# Patient Record
Sex: MALE | Race: ASIAN | NOT HISPANIC OR LATINO
[De-identification: names, ages, dates, MRNs, and addresses within clinical notes are randomized per-mention and may not be internally consistent; named-entity substitution may affect disease eponyms.]

---

## 2022-06-06 ENCOUNTER — APPOINTMENT (OUTPATIENT)
Dept: PULMONOLOGY | Facility: CLINIC | Age: 27
End: 2022-06-06
Payer: COMMERCIAL

## 2022-06-06 ENCOUNTER — LABORATORY RESULT (OUTPATIENT)
Age: 27
End: 2022-06-06

## 2022-06-06 VITALS
HEART RATE: 85 BPM | WEIGHT: 135 LBS | BODY MASS INDEX: 18.28 KG/M2 | SYSTOLIC BLOOD PRESSURE: 114 MMHG | DIASTOLIC BLOOD PRESSURE: 72 MMHG | TEMPERATURE: 97.2 F | HEIGHT: 72.05 IN | OXYGEN SATURATION: 99 %

## 2022-06-06 VITALS — RESPIRATION RATE: 16 BRPM

## 2022-06-06 DIAGNOSIS — R05.9 COUGH, UNSPECIFIED: ICD-10-CM

## 2022-06-06 PROCEDURE — 99204 OFFICE O/P NEW MOD 45 MIN: CPT | Mod: 25

## 2022-06-06 PROCEDURE — ZZZZZ: CPT

## 2022-06-06 PROCEDURE — 95012 NITRIC OXIDE EXP GAS DETER: CPT

## 2022-06-06 PROCEDURE — 94729 DIFFUSING CAPACITY: CPT

## 2022-06-06 PROCEDURE — 94010 BREATHING CAPACITY TEST: CPT

## 2022-06-06 PROCEDURE — 94726 PLETHYSMOGRAPHY LUNG VOLUMES: CPT

## 2022-06-06 NOTE — HISTORY OF PRESENT ILLNESS
[Never] : never [Current] : current [TextBox_4] : PMH chronic sinusitis, seasonal allergies. Recently worsening - PCP prescribed Augmentin, Breo, Flonase. Severe wheeze and cough, worse at night.  Symptoms improved with abx and Breo. \par \par Diagnosed with childhood asthma\par Physically active - swim, track\par \par Social: \par Former smoker\par Vape nicotine\par Marijuana (since age 18) \par User interfaces \par Pet dog - Shiba Inu\par \par

## 2022-06-06 NOTE — ASSESSMENT
[FreeTextEntry1] : Asthma, mild :  FENO 91 today. d/c Breo. Start Symbicort prn. Check asthma labs. Smoking cessation discussed. Monitor worsening congestion/ sputum changes- f/u ENT prn.\par \par RTC in one month\par \par IAnge NP, am scribing for and in the presence of Dr. Jericho Dooley, the following sections HISTORY OF PRESENT ILLNESS, PAST MEDICAL/FAMILY/SOCIAL HISTORY; REVIEW OF SYSTEMS; VITAL SIGNS; PHYSICAL EXAM; DISPOSITION.\par

## 2022-06-07 LAB
BASOPHILS # BLD AUTO: 0.1 K/UL
BASOPHILS NFR BLD AUTO: 1.9 %
EOSINOPHIL # BLD AUTO: 0.52 K/UL
EOSINOPHIL NFR BLD AUTO: 9.9 %
HCT VFR BLD CALC: 48 %
HGB BLD-MCNC: 16.6 G/DL
IMM GRANULOCYTES NFR BLD AUTO: 0.2 %
LYMPHOCYTES # BLD AUTO: 1.18 K/UL
LYMPHOCYTES NFR BLD AUTO: 22.5 %
MAN DIFF?: NORMAL
MCHC RBC-ENTMCNC: 31.1 PG
MCHC RBC-ENTMCNC: 34.6 GM/DL
MCV RBC AUTO: 90.1 FL
MONOCYTES # BLD AUTO: 0.43 K/UL
MONOCYTES NFR BLD AUTO: 8.2 %
NEUTROPHILS # BLD AUTO: 3.01 K/UL
NEUTROPHILS NFR BLD AUTO: 57.3 %
PLATELET # BLD AUTO: 162 K/UL
RBC # BLD: 5.33 M/UL
RBC # FLD: 12.3 %
WBC # FLD AUTO: 5.25 K/UL

## 2022-06-08 LAB — TOTAL IGE SMQN RAST: 289 KU/L

## 2022-06-13 LAB
A ALTERNATA IGE QN: <0.1 KUA/L
A FUMIGATUS IGE QN: <0.1 KUA/L
BERMUDA GRASS IGE QN: 0.15 KUA/L
BOXELDER IGE QN: 0.25 KUA/L
C HERBARUM IGE QN: <0.1 KUA/L
CALIF WALNUT IGE QN: NORMAL
CAT DANDER IGE QN: <0.1 KUA/L
CMN PIGWEED IGE QN: <0.1 KUA/L
COMMON RAGWEED IGE QN: 0.14 KUA/L
COTTONWOOD IGE QN: 0.3 KUA/L
D FARINAE IGE QN: 9.88 KUA/L
D PTERONYSS IGE QN: 6.04 KUA/L
DEPRECATED A ALTERNATA IGE RAST QL: 0
DEPRECATED A FUMIGATUS IGE RAST QL: 0
DEPRECATED BERMUDA GRASS IGE RAST QL: NORMAL
DEPRECATED BOXELDER IGE RAST QL: NORMAL
DEPRECATED C HERBARUM IGE RAST QL: 0
DEPRECATED CAT DANDER IGE RAST QL: 0
DEPRECATED COMMON PIGWEED IGE RAST QL: 0
DEPRECATED COMMON RAGWEED IGE RAST QL: NORMAL
DEPRECATED COTTONWOOD IGE RAST QL: NORMAL
DEPRECATED D FARINAE IGE RAST QL: 3
DEPRECATED D PTERONYSS IGE RAST QL: 3
DEPRECATED DOG DANDER IGE RAST QL: 0
DEPRECATED GOOSEFOOT IGE RAST QL: NORMAL
DEPRECATED LONDON PLANE IGE RAST QL: NORMAL
DEPRECATED MOUSE URINE PROT IGE RAST QL: NORMAL
DEPRECATED MUGWORT IGE RAST QL: 0
DEPRECATED P NOTATUM IGE RAST QL: 0
DEPRECATED RED CEDAR IGE RAST QL: NORMAL
DEPRECATED ROACH IGE RAST QL: 0
DEPRECATED SHEEP SORREL IGE RAST QL: NORMAL
DEPRECATED SILVER BIRCH IGE RAST QL: 2
DEPRECATED TIMOTHY IGE RAST QL: NORMAL
DEPRECATED WHITE ASH IGE RAST QL: NORMAL
DEPRECATED WHITE OAK IGE RAST QL: 2
DOG DANDER IGE QN: <0.1 KUA/L
GOOSEFOOT IGE QN: 0.2 KUA/L
LONDON PLANE IGE QN: NORMAL
MOUSE URINE PROT IGE QN: NORMAL
MUGWORT IGE QN: <0.1 KUA/L
MULBERRY (T70) CLASS: NORMAL
MULBERRY (T70) CONC: NORMAL
P NOTATUM IGE QN: <0.1 KUA/L
RED CEDAR IGE QN: NORMAL
ROACH IGE QN: <0.1 KUA/L
SHEEP SORREL IGE QN: NORMAL
SILVER BIRCH IGE QN: 2.51 KUA/L
TIMOTHY IGE QN: NORMAL
TREE ALLERG MIX1 IGE QL: NORMAL
WHITE ASH IGE QN: NORMAL
WHITE ELM IGE QN: 0.28 KUA/L
WHITE ELM IGE QN: NORMAL
WHITE OAK IGE QN: 2.21 KUA/L

## 2022-10-17 ENCOUNTER — NON-APPOINTMENT (OUTPATIENT)
Age: 27
End: 2022-10-17

## 2022-10-18 ENCOUNTER — APPOINTMENT (OUTPATIENT)
Dept: PULMONOLOGY | Facility: CLINIC | Age: 27
End: 2022-10-18

## 2022-10-18 VITALS
BODY MASS INDEX: 19.56 KG/M2 | WEIGHT: 146 LBS | HEIGHT: 72.25 IN | RESPIRATION RATE: 15 BRPM | TEMPERATURE: 97.3 F | HEART RATE: 71 BPM | DIASTOLIC BLOOD PRESSURE: 83 MMHG | OXYGEN SATURATION: 97 % | SYSTOLIC BLOOD PRESSURE: 125 MMHG

## 2022-10-18 PROCEDURE — 99214 OFFICE O/P EST MOD 30 MIN: CPT

## 2022-10-18 NOTE — HISTORY OF PRESENT ILLNESS
[TextBox_4] : 27 y.o male PMH chronic sinusitis, seasonal allergies, deviated septum, nasal polyps,  Recently worsening - PCP prescribed Augmentin, Breo, Flonase. 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 d/c Breo. Start Symbicort prn. Check asthma labs. Smoking cessation discussed. Monitor worsening congestion/ sputum changes- f/u ENT prn.\par \par RTC in one month\par \par

## 2022-10-18 NOTE — ASSESSMENT
[FreeTextEntry1] : Asthma, mild :symptoms controlled with Symbicort prn. Ongoing smoking cessation reinforced. Referred to ENT for eval nasal polyps\par \par RTC in 6 months or earlier prn\par \par Ange FRANKLIN NP, am scribing for and in the presence of Dr. Jericho Dooley, the following sections HISTORY OF PRESENT ILLNESS, PAST MEDICAL/FAMILY/SOCIAL HISTORY; REVIEW OF SYSTEMS; VITAL SIGNS; PHYSICAL EXAM; DISPOSITION.\par

## 2022-10-26 ENCOUNTER — NON-APPOINTMENT (OUTPATIENT)
Age: 27
End: 2022-10-26

## 2022-10-26 RX ORDER — ALBUTEROL SULFATE 90 UG/1
108 (90 BASE) INHALANT RESPIRATORY (INHALATION)
Qty: 1 | Refills: 3 | Status: ACTIVE | COMMUNITY
Start: 2022-10-26 | End: 1900-01-01

## 2022-10-27 ENCOUNTER — NON-APPOINTMENT (OUTPATIENT)
Age: 27
End: 2022-10-27

## 2022-10-28 ENCOUNTER — NON-APPOINTMENT (OUTPATIENT)
Age: 27
End: 2022-10-28

## 2023-06-15 ENCOUNTER — NON-APPOINTMENT (OUTPATIENT)
Age: 28
End: 2023-06-15

## 2023-08-09 ENCOUNTER — APPOINTMENT (OUTPATIENT)
Dept: PULMONOLOGY | Facility: CLINIC | Age: 28
End: 2023-08-09
Payer: COMMERCIAL

## 2023-08-09 VITALS
RESPIRATION RATE: 15 BRPM | HEIGHT: 72.25 IN | OXYGEN SATURATION: 94 % | TEMPERATURE: 99.4 F | DIASTOLIC BLOOD PRESSURE: 80 MMHG | BODY MASS INDEX: 19.16 KG/M2 | WEIGHT: 143 LBS | HEART RATE: 68 BPM | SYSTOLIC BLOOD PRESSURE: 126 MMHG

## 2023-08-09 PROCEDURE — 99214 OFFICE O/P EST MOD 30 MIN: CPT

## 2023-08-09 NOTE — HISTORY OF PRESENT ILLNESS
[Never] : never [Current] : current [TextBox_4] : 27 y.o male PMH chronic sinusitis, seasonal allergies, deviated septum, nasal polyps, Using Symbicort 1 puff bid consistently. No exacerbations, hospitalizations, ED visits.   PFT June 2022: FENO 91, FEV1/FVC 80, FEV1: 4.08 (100)  Social: Marijuana smoker

## 2023-08-09 NOTE — ASSESSMENT
[FreeTextEntry1] : Asthma: mild: stable on Symbicort prn.Con current therapy.   Annual follow up or earlier Ange Barrientos NP, am scribing for and in the presence of Dr. Jericho Dooley, the following sections HISTORY OF PRESENT ILLNESS, PAST MEDICAL/FAMILY/SOCIAL HISTORY; REVIEW OF SYSTEMS; VITAL SIGNS; PHYSICAL EXAM; DISPOSITION.

## 2024-01-24 ENCOUNTER — APPOINTMENT (OUTPATIENT)
Dept: INTERNAL MEDICINE | Facility: CLINIC | Age: 29
End: 2024-01-24
Payer: COMMERCIAL

## 2024-01-24 ENCOUNTER — NON-APPOINTMENT (OUTPATIENT)
Age: 29
End: 2024-01-24

## 2024-01-24 VITALS
WEIGHT: 140 LBS | BODY MASS INDEX: 18.55 KG/M2 | HEART RATE: 80 BPM | DIASTOLIC BLOOD PRESSURE: 81 MMHG | HEIGHT: 73 IN | TEMPERATURE: 97.8 F | RESPIRATION RATE: 15 BRPM | SYSTOLIC BLOOD PRESSURE: 117 MMHG | OXYGEN SATURATION: 97 %

## 2024-01-24 DIAGNOSIS — Z83.3 FAMILY HISTORY OF DIABETES MELLITUS: ICD-10-CM

## 2024-01-24 DIAGNOSIS — Z82.49 FAMILY HISTORY OF ISCHEMIC HEART DISEASE AND OTHER DISEASES OF THE CIRCULATORY SYSTEM: ICD-10-CM

## 2024-01-24 DIAGNOSIS — Z00.00 ENCOUNTER FOR GENERAL ADULT MEDICAL EXAMINATION W/OUT ABNORMAL FINDINGS: ICD-10-CM

## 2024-01-24 DIAGNOSIS — Z23 ENCOUNTER FOR IMMUNIZATION: ICD-10-CM

## 2024-01-24 DIAGNOSIS — K21.9 GASTRO-ESOPHAGEAL REFLUX DISEASE W/OUT ESOPHAGITIS: ICD-10-CM

## 2024-01-24 PROCEDURE — 36415 COLL VENOUS BLD VENIPUNCTURE: CPT

## 2024-01-24 PROCEDURE — G0008: CPT

## 2024-01-24 PROCEDURE — 99385 PREV VISIT NEW AGE 18-39: CPT | Mod: 25

## 2024-01-24 PROCEDURE — 90686 IIV4 VACC NO PRSV 0.5 ML IM: CPT

## 2024-01-24 RX ORDER — BUDESONIDE AND FORMOTEROL FUMARATE DIHYDRATE 80; 4.5 UG/1; UG/1
80-4.5 AEROSOL RESPIRATORY (INHALATION) TWICE DAILY
Qty: 1 | Refills: 11 | Status: COMPLETED | COMMUNITY
Start: 2022-06-06 | End: 2024-01-24

## 2024-01-24 RX ORDER — FLUTICASONE FUROATE AND VILANTEROL TRIFENATATE 200; 25 UG/1; UG/1
200-25 POWDER RESPIRATORY (INHALATION)
Refills: 0 | Status: COMPLETED | COMMUNITY
End: 2024-01-24

## 2024-01-24 NOTE — PHYSICAL EXAM
[No Acute Distress] : no acute distress [Well-Appearing] : well-appearing [Normal Sclera/Conjunctiva] : normal sclera/conjunctiva [EOMI] : extraocular movements intact [No Respiratory Distress] : no respiratory distress  [No Accessory Muscle Use] : no accessory muscle use [Clear to Auscultation] : lungs were clear to auscultation bilaterally [Normal Rate] : normal rate  [Regular Rhythm] : with a regular rhythm [Normal S1, S2] : normal S1 and S2 [No Edema] : there was no peripheral edema [No Extremity Clubbing/Cyanosis] : no extremity clubbing/cyanosis [Soft] : abdomen soft [Non Tender] : non-tender [Non-distended] : non-distended [Normal Bowel Sounds] : normal bowel sounds [Normal Insight/Judgement] : insight and judgment were intact [Normal Affect] : the affect was normal

## 2024-01-24 NOTE — ASSESSMENT
[FreeTextEntry1] : HCM -well visit -routine labs ordered -depression screen negative -flu vaccine- given and tolerated well  -covid vaccines- s/p 3 doses  -tdap -unsure when last received, advised to check records and obtain if 10+ years since last dose -advised to get annual eye, dental, and skin exams -RTC for CPE in 1 year or sooner prn  Reflux -trial pepcid -lifestyle modifications discussed including head of bed elevation at night, avoiding lying down 2-3 hours after eating, eating small, frequent meals, and minimizing potential food triggers (tomatoes/sauce, caffeine, alcohol, spicy foods, citrus foods, red meat, chocolate, mint, carbonated beverages -RTC if refractory  Asthma -c/w symbicort bid and albuterol prn -follows [facility] pulm Dr. Dooley   Chronic sinusitis Nasal polyps -ENT referral

## 2024-01-24 NOTE — HISTORY OF PRESENT ILLNESS
[FreeTextEntry1] : cpe/est care [de-identified] : POLINA FERNANDEZ is a 28 year M who presents for CPE/est care PMHx chronic sinusitis, nasal polyps, asthma  Previous PCP Dr. Jay Coello- retired  Feels well overall  Reports reflux early in the morning which self resolves within an hour of waking. Eats late at night, sleeps shortly after, skips breakfast but drinks coffee.   Pulm Dr. Ilowite

## 2024-01-24 NOTE — HEALTH RISK ASSESSMENT
[No] : No [Yes] : In the past 12 months have you used drugs other than those required for medical reasons? Yes [0] : 2) Feeling down, depressed, or hopeless: Not at all (0) [PHQ-2 Negative - No further assessment needed] : PHQ-2 Negative - No further assessment needed [Employed] : employed [] :  [# Of Children ___] : has [unfilled] children [Feels Safe at Home] : Feels safe at home [Fully functional (bathing, dressing, toileting, transferring, walking, feeding)] : Fully functional (bathing, dressing, toileting, transferring, walking, feeding) [Fully functional (using the telephone, shopping, preparing meals, housekeeping, doing laundry, using] : Fully functional and needs no help or supervision to perform IADLs (using the telephone, shopping, preparing meals, housekeeping, doing laundry, using transportation, managing medications and managing finances) [Former] : Former [0-4] : 0-4 [< 15 Years] : < 15 Years [de-identified] : vapes marijuana [[id number]] : 0 [de-identified] : wife [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

## 2024-01-25 LAB
25(OH)D3 SERPL-MCNC: 19.1 NG/ML
ALBUMIN SERPL ELPH-MCNC: 5 G/DL
ALP BLD-CCNC: 43 U/L
ALT SERPL-CCNC: 24 U/L
ANION GAP SERPL CALC-SCNC: 13 MMOL/L
AST SERPL-CCNC: 23 U/L
BILIRUB SERPL-MCNC: 0.5 MG/DL
BUN SERPL-MCNC: 13 MG/DL
CALCIUM SERPL-MCNC: 9.8 MG/DL
CHLORIDE SERPL-SCNC: 104 MMOL/L
CHOLEST SERPL-MCNC: 233 MG/DL
CO2 SERPL-SCNC: 22 MMOL/L
CREAT SERPL-MCNC: 0.91 MG/DL
EGFR: 118 ML/MIN/1.73M2
ESTIMATED AVERAGE GLUCOSE: 108 MG/DL
GLUCOSE SERPL-MCNC: 98 MG/DL
HBA1C MFR BLD HPLC: 5.4 %
HCT VFR BLD CALC: 48.5 %
HDLC SERPL-MCNC: 63 MG/DL
HGB BLD-MCNC: 16.8 G/DL
LDLC SERPL CALC-MCNC: 156 MG/DL
MCHC RBC-ENTMCNC: 31.5 PG
MCHC RBC-ENTMCNC: 34.6 GM/DL
MCV RBC AUTO: 91 FL
NONHDLC SERPL-MCNC: 170 MG/DL
PLATELET # BLD AUTO: 161 K/UL
POTASSIUM SERPL-SCNC: 4.1 MMOL/L
PROT SERPL-MCNC: 6.9 G/DL
RBC # BLD: 5.33 M/UL
RBC # FLD: 12.6 %
SODIUM SERPL-SCNC: 139 MMOL/L
TRIGL SERPL-MCNC: 81 MG/DL
TSH SERPL-ACNC: 2.51 UIU/ML
WBC # FLD AUTO: 5.07 K/UL

## 2024-03-19 ENCOUNTER — APPOINTMENT (OUTPATIENT)
Dept: PULMONOLOGY | Facility: CLINIC | Age: 29
End: 2024-03-19
Payer: COMMERCIAL

## 2024-03-19 VITALS
BODY MASS INDEX: 18.16 KG/M2 | RESPIRATION RATE: 16 BRPM | HEIGHT: 73 IN | WEIGHT: 137 LBS | HEART RATE: 71 BPM | TEMPERATURE: 97.8 F | OXYGEN SATURATION: 97 % | DIASTOLIC BLOOD PRESSURE: 61 MMHG | SYSTOLIC BLOOD PRESSURE: 123 MMHG

## 2024-03-19 PROCEDURE — 99214 OFFICE O/P EST MOD 30 MIN: CPT

## 2024-03-19 NOTE — HISTORY OF PRESENT ILLNESS
[Current] : current [Never] : never [TextBox_4] : 27 y.o male PMH chronic sinusitis, seasonal allergies, deviated septum, nasal polyps, Doing well on symbicort, had to change to Breyna for insurance reasons. No exacerbations.  PFT June 2022: FENO 91, FEV1/FVC 80, FEV1: 4.08 (100)  Social: Marijuana smoker and vaping.  Using Breyna about once or twice a day.

## 2024-03-19 NOTE — PHYSICAL EXAM
[No Acute Distress] : no acute distress [Normal Rate/Rhythm] : normal rate/rhythm [No Resp Distress] : no resp distress [Normal S1, S2] : normal s1, s2 [Clear to Auscultation Bilaterally] : clear to auscultation bilaterally [Normal Gait] : normal gait [No Edema] : no edema [Oriented x3] : oriented x3 [No Focal Deficits] : no focal deficits

## 2024-03-19 NOTE — ASSESSMENT
[FreeTextEntry1] : Asthma: mild: stable on Breyna prn.Con current therapy.   Annual follow up or earlier orn

## 2024-04-05 ENCOUNTER — NON-APPOINTMENT (OUTPATIENT)
Age: 29
End: 2024-04-05

## 2024-04-05 ENCOUNTER — APPOINTMENT (OUTPATIENT)
Dept: OTOLARYNGOLOGY | Facility: CLINIC | Age: 29
End: 2024-04-05
Payer: COMMERCIAL

## 2024-04-05 VITALS — TEMPERATURE: 98.2 F | WEIGHT: 137 LBS | BODY MASS INDEX: 18.16 KG/M2 | HEIGHT: 73 IN

## 2024-04-05 DIAGNOSIS — J33.9 NASAL POLYP, UNSPECIFIED: ICD-10-CM

## 2024-04-05 DIAGNOSIS — J45.909 UNSPECIFIED ASTHMA, UNCOMPLICATED: ICD-10-CM

## 2024-04-05 DIAGNOSIS — H93.293 OTHER ABNORMAL AUDITORY PERCEPTIONS, BILATERAL: ICD-10-CM

## 2024-04-05 DIAGNOSIS — H69.93 UNSPECIFIED EUSTACHIAN TUBE DISORDER, BILATERAL: ICD-10-CM

## 2024-04-05 DIAGNOSIS — H61.23 IMPACTED CERUMEN, BILATERAL: ICD-10-CM

## 2024-04-05 DIAGNOSIS — J32.9 CHRONIC SINUSITIS, UNSPECIFIED: ICD-10-CM

## 2024-04-05 PROCEDURE — 92557 COMPREHENSIVE HEARING TEST: CPT

## 2024-04-05 PROCEDURE — 31231 NASAL ENDOSCOPY DX: CPT

## 2024-04-05 PROCEDURE — 92567 TYMPANOMETRY: CPT

## 2024-04-05 PROCEDURE — 99204 OFFICE O/P NEW MOD 45 MIN: CPT | Mod: 25

## 2024-04-05 NOTE — PHYSICAL EXAM
[Midline] : trachea located in midline position [Normal] : no rashes [de-identified] : Cerumen obstructin view of TM AU [de-identified] : b/l retraction [Nasal Endoscopy Performed] : nasal endoscopy was performed, see procedure section for findings [] : septum deviated to the right

## 2024-04-05 NOTE — HISTORY OF PRESENT ILLNESS
[de-identified] : 29 y/o M with ear fullness and muffled hearing.  Worse after a shower.  Gets mild tinnitus when ear feels fullest.  No vertigo. Has seasonal allergies and Asthma, and hx of nasal polyps.  Has never taken ASA to know if has an allergy.  following with Pulm, Dr. Dooley.  Uses an MDI and when allergies flair up takes Zyrtec.

## 2024-04-05 NOTE — END OF VISIT
[FreeTextEntry3] : I personally saw and examined POLINA FERNANDEZ in detail. I spoke to LIN Gray regarding the assessment and plan of care. I reviewed the above assessment and plan of care and agree. I have made changes in changes in the body of the note where appropriate. I personally reviewed the HPI, PMH, FH, SH, ROS and medications/allergies. I have spoken to LIN Gray regarding the history and have personally determined the assessment and plan of care and documented this myself. I was present and participated in all key portions of the encounter and all procedures noted above. I performed all procedures and performed the physical exam. I have made changes in the body of the note where appropriate.   Attesting Faculty: See Attending Signature Below

## 2024-04-05 NOTE — PROCEDURE
[FreeTextEntry3] : Procedure - Cerumen Removal.  After informed verbal consent is obtained, cerumen is removed from the b/l ear canal with a alligator forceps and suction.  Normal appearing canal following removal. [FreeTextEntry6] : Procedure performed: Nasal Endoscopy- Diagnostic indication(s): nasal congestion, Anterior rhinoscopy insufficient to account for symptoms Verbal and/or written consent obtained from patient, parent, and/or guardian Scope #: 23,  flexible fiber optic telescope used The scope was introduced in the nasal passage between the middle and inferior turbinates to exam the inferior portion of the middle meatus and the fontanelle, as well as the maxillary ostia.  Next, the scope was passed medically and posteriorly to the middle turbinates to examine the sphenoethmoid recess and the superior turbinate region. Upon visualization the finders are as follows: Nasal Septum: with Large right Deviation Right Side: Mucosa: boggy turbinates, Mucous: scant, Polyp: not seen, Inferior Turbinate: boggy, Middle Turbinate: normal, Superior Turbinate: normal, Inferior Meatus: narrow, Middle Meatus: narrow, Super Meatus:normal, Sphenoethmoidal Recess: clear Left Side: Mucosa: boggy turbinates, Mucous: scant, Polyp: not seen, Inferior Turbinate: boggy, Middle Turbinate: normal, Superior Turbinate: normal, Inferior Meatus: narrow, Middle Meatus: narrow, Super Meatus:normal, Sphenoethmoidal Recess: clear The patient tolerated the procedure well

## 2024-04-05 NOTE — ASSESSMENT
[FreeTextEntry1] : B/L ETD, likely due to allergic rhinitis complicated by large right DNS: - Audiogram today - - Rx - Flonase - Rx - Azelastine - F/U with allergist, Dr. Colon, information given.  Also advised to d/c all antihistamines 10 days before the appt.  - F/U with Dr. Barry to discuss septoplasty   B/L cerumen: - removed in office today

## 2024-04-26 ENCOUNTER — APPOINTMENT (OUTPATIENT)
Dept: OTOLARYNGOLOGY | Facility: CLINIC | Age: 29
End: 2024-04-26
Payer: COMMERCIAL

## 2024-04-26 VITALS — HEIGHT: 73 IN | WEIGHT: 140 LBS | HEART RATE: 84 BPM | BODY MASS INDEX: 18.55 KG/M2

## 2024-04-26 DIAGNOSIS — J30.89 OTHER ALLERGIC RHINITIS: ICD-10-CM

## 2024-04-26 DIAGNOSIS — M95.0 ACQUIRED DEFORMITY OF NOSE: ICD-10-CM

## 2024-04-26 DIAGNOSIS — J34.3 HYPERTROPHY OF NASAL TURBINATES: ICD-10-CM

## 2024-04-26 DIAGNOSIS — J34.2 DEVIATED NASAL SEPTUM: ICD-10-CM

## 2024-04-26 PROCEDURE — 99214 OFFICE O/P EST MOD 30 MIN: CPT | Mod: 25

## 2024-04-26 PROCEDURE — 31231 NASAL ENDOSCOPY DX: CPT

## 2024-04-26 RX ORDER — CETIRIZINE HCL 10 MG
TABLET ORAL
Refills: 0 | Status: ACTIVE | COMMUNITY

## 2024-04-26 RX ORDER — FAMOTIDINE 20 MG/1
20 TABLET, FILM COATED ORAL
Qty: 30 | Refills: 1 | Status: COMPLETED | COMMUNITY
Start: 2024-01-24 | End: 2024-04-26

## 2024-04-26 RX ORDER — FLUTICASONE PROPIONATE 50 UG/1
50 SPRAY, METERED NASAL
Qty: 3 | Refills: 0 | Status: COMPLETED | COMMUNITY
Start: 2024-04-05 | End: 2024-04-26

## 2024-04-26 RX ORDER — AZELASTINE HYDROCHLORIDE 137 UG/1
0.1 SPRAY, METERED NASAL TWICE DAILY
Qty: 3 | Refills: 3 | Status: COMPLETED | COMMUNITY
Start: 2024-04-05 | End: 2024-04-26

## 2024-04-26 RX ORDER — BUDESONIDE AND FORMOTEROL FUMARATE 160; 4.5 UG/1; UG/1
160-4.5 AEROSOL, METERED RESPIRATORY (INHALATION)
Refills: 0 | Status: ACTIVE | COMMUNITY

## 2024-04-26 RX ORDER — BUDESONIDE AND FORMOTEROL FUMARATE 80; 4.5 UG/1; UG/1
80-4.5 AEROSOL, METERED RESPIRATORY (INHALATION)
Qty: 1 | Refills: 11 | Status: COMPLETED | COMMUNITY
Start: 2024-01-08 | End: 2024-04-26

## 2024-04-26 NOTE — END OF VISIT
[FreeTextEntry3] :  I personally saw and examined POLINA FERNANDEZ in detail.  I spoke to DEEPA CEBALLOS regarding the assessment and plan of care. I performed the procedures and relevant physical exam.   I have made changes to the body of the note wherever necessary and appropriate.

## 2024-04-26 NOTE — HISTORY OF PRESENT ILLNESS
[de-identified] : 28 year old male previously seen by Dr. Zaidi presents for left deviated septum Taking zyrtec for seasonal allergies. Dr. Zaidi recommended septoplasty.  Last few months hes had bad pressure in left ear when he blows his nose or cleans his sinus.  When hes congested his left side gives him the most issues. Gets sinus infections every winter Denies recent fevers or infections, difficulty breathing. Did have surgery 15 years ago in nose for polyps, was told had a deviated septum at that time but was also told he was too young for repair. Using flonase, no significant improvement

## 2024-04-26 NOTE — PROCEDURE
[FreeTextEntry6] : Procedure performed: Nasal Endoscopy- Diagnostic Pre-op indication(s): nasal congestion Post-op indication(s): nasal congestion Verbal and/or written consent obtained from patient Anterior rhinoscopy insufficient to account for symptoms Scope #: 3, flexible fiber optic telescope The scope was introduced in the nasal passage between the middle and inferior turbinates to exam the inferior portion of the middle meatus and the fontanelle, as well as the maxillary ostia. Next, the scope was passed medically and posteriorly to the middle turbinates to examine the sphenoethmoid recess and the superior turbinate region.  Upon visualization the finders are as follows: Nasal Septum: sigmoidal septal deviation Bilateral - Mucosa: boggy turbinates, Mucous: scant, Polyp: not seen, Inferior Turbinate: boggy, Middle Turbinate: normal, Superior Turbinate: normal, Inferior Meatus: narrow, Middle Meatus: narrow, Super Meatus: normal, Sphenoethmoidal Recess: clear  BITH, copious clear secretions

## 2024-04-26 NOTE — PHYSICAL EXAM
[Nasal Endoscopy Performed] : nasal endoscopy was performed, see procedure section for findings [] : septum deviated to the left [Normal] : no abnormal secretions [de-identified] : + carrington bilaterally R>L

## 2024-04-26 NOTE — ASSESSMENT
[FreeTextEntry1] : 28 year old male presents for evaluation of septal deviation and difficulty breathing. On exam, + carrington bilaterally R>L, significant L DNS. BITH, copious clear secretions consistent with allergic rhinitis.  - continue OTC antihistamines  - continue Flonase for one month. A topical steroid reduce mucosal swelling, illustrated appropriate use and how to reduce the risk of bleeding - advised patient likely will not have significant improvement with sprays as septum is significantly deviated  - discussed r/b/a septoplasty (likely swinging door), turbs, valve. ordered booking.   - d./w patient possibility of intranasal splint for 1 week - postoperative instructions were discussed with the patient including no aerobic exercise for 2 weeks, no heavy lifting for 2 weeks - risks of bleeding and septal perforation were discussed  - all questions were answered

## 2024-06-03 ENCOUNTER — OUTPATIENT (OUTPATIENT)
Dept: OUTPATIENT SERVICES | Facility: HOSPITAL | Age: 29
LOS: 1 days | End: 2024-06-03
Payer: COMMERCIAL

## 2024-06-03 VITALS
DIASTOLIC BLOOD PRESSURE: 78 MMHG | HEART RATE: 72 BPM | TEMPERATURE: 99 F | OXYGEN SATURATION: 97 % | HEIGHT: 73 IN | RESPIRATION RATE: 16 BRPM | SYSTOLIC BLOOD PRESSURE: 122 MMHG | WEIGHT: 138.01 LBS

## 2024-06-03 DIAGNOSIS — Z01.818 ENCOUNTER FOR OTHER PREPROCEDURAL EXAMINATION: ICD-10-CM

## 2024-06-03 DIAGNOSIS — J34.3 HYPERTROPHY OF NASAL TURBINATES: ICD-10-CM

## 2024-06-03 DIAGNOSIS — J45.909 UNSPECIFIED ASTHMA, UNCOMPLICATED: ICD-10-CM

## 2024-06-03 DIAGNOSIS — J34.2 DEVIATED NASAL SEPTUM: ICD-10-CM

## 2024-06-03 DIAGNOSIS — Z98.890 OTHER SPECIFIED POSTPROCEDURAL STATES: Chronic | ICD-10-CM

## 2024-06-03 DIAGNOSIS — J34.89 OTHER SPECIFIED DISORDERS OF NOSE AND NASAL SINUSES: ICD-10-CM

## 2024-06-03 LAB
HCT VFR BLD CALC: 45.3 % — SIGNIFICANT CHANGE UP (ref 39–50)
HGB BLD-MCNC: 15.9 G/DL — SIGNIFICANT CHANGE UP (ref 13–17)
MCHC RBC-ENTMCNC: 30.8 PG — SIGNIFICANT CHANGE UP (ref 27–34)
MCHC RBC-ENTMCNC: 35.1 GM/DL — SIGNIFICANT CHANGE UP (ref 32–36)
MCV RBC AUTO: 87.8 FL — SIGNIFICANT CHANGE UP (ref 80–100)
NRBC # BLD: 0 /100 WBCS — SIGNIFICANT CHANGE UP (ref 0–0)
PLATELET # BLD AUTO: 177 K/UL — SIGNIFICANT CHANGE UP (ref 150–400)
RBC # BLD: 5.16 M/UL — SIGNIFICANT CHANGE UP (ref 4.2–5.8)
RBC # FLD: 12 % — SIGNIFICANT CHANGE UP (ref 10.3–14.5)
WBC # BLD: 6.48 K/UL — SIGNIFICANT CHANGE UP (ref 3.8–10.5)
WBC # FLD AUTO: 6.48 K/UL — SIGNIFICANT CHANGE UP (ref 3.8–10.5)

## 2024-06-03 PROCEDURE — G0463: CPT

## 2024-06-03 PROCEDURE — 85027 COMPLETE CBC AUTOMATED: CPT

## 2024-06-03 RX ORDER — DEXTROSE MONOHYDRATE AND SODIUM CHLORIDE 5; .3 G/100ML; G/100ML
1000 INJECTION, SOLUTION INTRAVENOUS
Refills: 0 | Status: DISCONTINUED | OUTPATIENT
Start: 2024-06-17 | End: 2024-07-01

## 2024-06-03 NOTE — H&P PST ADULT - HISTORY OF PRESENT ILLNESS
30 yo male  congestion, difficulty breathing from the left side, sinus infection  paxlovid  30 yo male, PMH asthma with allergic rhinitis - controlled well with Breyna inhaler and Zirtec, pt. reports nasal congestion, difficulty breathing from his nostril, sinus infections at least once a year - pt. presents to PST for scheduled Septoplasty, Bilateral Turbinate Reduction, Nasal Valve Repair on 6/17/24. Denies recent fever, chills, chest pain, SOB, changes in bowel/urinary habits or recent exposure to COVID-19 infection. Pt. denies clotting or bleeding disorders.

## 2024-06-03 NOTE — H&P PST ADULT - ASSESSMENT
Activity: Can walk >5 blocks, >2 flights of stairs    DASI scale: 9.89    Mallampati:    Dentition: Denies loose teeth/denutres Activity: Can walk >5 blocks, >2 flights of stairs    DASI scale: 9.89    Mallampati: 1    Dentition: Denies loose teeth/dentures

## 2024-06-03 NOTE — H&P PST ADULT - NSICDXPASTMEDICALHX_GEN_ALL_CORE_FT
PAST MEDICAL HISTORY:  Deviated septum      PAST MEDICAL HISTORY:  Asthma with allergic rhinitis     COVID-19 virus infection     Deviated septum

## 2024-06-03 NOTE — H&P PST ADULT - RS GEN HX ROS MEA POS PC
using more frequently now due to allergies/wheezing using rescue inhaler more frequently now due to allergies/wheezing

## 2024-06-03 NOTE — H&P PST ADULT - NEGATIVE PSYCHIATRIC SYMPTOMS
[Unemployed] : unemployed [Feels Safe at Home] : Feels safe at home [Smoke Detector] : smoke detector [Carbon Monoxide Detector] : carbon monoxide detector [Seat Belt] :  uses seat belt [Sunscreen] : uses sunscreen [] : No [Guns at Home] : no guns at home [MammogramComments] : pt. does not need one [PapSmearComments] : pt. does not need one [BoneDensityComments] : pt. does not need one [ColonoscopyComments] : pt. does not need one no suicidal ideation/no depression/no anxiety

## 2024-06-12 ENCOUNTER — NON-APPOINTMENT (OUTPATIENT)
Age: 29
End: 2024-06-12

## 2024-06-12 RX ORDER — OXYCODONE 5 MG/1
5 TABLET ORAL
Qty: 6 | Refills: 0 | Status: ACTIVE | COMMUNITY
Start: 2024-06-12 | End: 1900-01-01

## 2024-06-12 RX ORDER — DIAZEPAM 5 MG/1
5 TABLET ORAL EVERY 6 HOURS
Qty: 25 | Refills: 0 | Status: COMPLETED | COMMUNITY
Start: 2024-06-12 | End: 2024-06-18

## 2024-06-12 RX ORDER — CEPHALEXIN 500 MG/1
500 CAPSULE ORAL TWICE DAILY
Qty: 10 | Refills: 0 | Status: ACTIVE | COMMUNITY
Start: 2024-06-12 | End: 1900-01-01

## 2024-06-12 RX ORDER — NALOXONE HYDROCHLORIDE 4 MG/.1ML
4 SPRAY NASAL
Qty: 1 | Refills: 0 | Status: ACTIVE | COMMUNITY
Start: 2024-06-12 | End: 1900-01-01

## 2024-06-16 ENCOUNTER — TRANSCRIPTION ENCOUNTER (OUTPATIENT)
Age: 29
End: 2024-06-16

## 2024-06-17 ENCOUNTER — OUTPATIENT (OUTPATIENT)
Dept: OUTPATIENT SERVICES | Facility: HOSPITAL | Age: 29
LOS: 1 days | End: 2024-06-17
Payer: COMMERCIAL

## 2024-06-17 ENCOUNTER — TRANSCRIPTION ENCOUNTER (OUTPATIENT)
Age: 29
End: 2024-06-17

## 2024-06-17 ENCOUNTER — APPOINTMENT (OUTPATIENT)
Dept: OTOLARYNGOLOGY | Facility: HOSPITAL | Age: 29
End: 2024-06-17

## 2024-06-17 VITALS
DIASTOLIC BLOOD PRESSURE: 86 MMHG | SYSTOLIC BLOOD PRESSURE: 136 MMHG | OXYGEN SATURATION: 97 % | RESPIRATION RATE: 16 BRPM | HEART RATE: 72 BPM

## 2024-06-17 VITALS
DIASTOLIC BLOOD PRESSURE: 73 MMHG | RESPIRATION RATE: 16 BRPM | HEART RATE: 61 BPM | OXYGEN SATURATION: 96 % | WEIGHT: 138.01 LBS | HEIGHT: 73 IN | TEMPERATURE: 97 F | SYSTOLIC BLOOD PRESSURE: 109 MMHG

## 2024-06-17 DIAGNOSIS — Z98.890 OTHER SPECIFIED POSTPROCEDURAL STATES: Chronic | ICD-10-CM

## 2024-06-17 DIAGNOSIS — J34.3 HYPERTROPHY OF NASAL TURBINATES: ICD-10-CM

## 2024-06-17 DIAGNOSIS — J34.89 OTHER SPECIFIED DISORDERS OF NOSE AND NASAL SINUSES: ICD-10-CM

## 2024-06-17 DIAGNOSIS — J34.2 DEVIATED NASAL SEPTUM: ICD-10-CM

## 2024-06-17 PROCEDURE — 30465 REPAIR NASAL STENOSIS: CPT

## 2024-06-17 PROCEDURE — 30140 RESECT INFERIOR TURBINATE: CPT

## 2024-06-17 PROCEDURE — C9399: CPT

## 2024-06-17 PROCEDURE — 30520 REPAIR OF NASAL SEPTUM: CPT

## 2024-06-17 PROCEDURE — 30140 RESECT INFERIOR TURBINATE: CPT | Mod: 50

## 2024-06-17 RX ORDER — FENTANYL CITRATE 50 UG/ML
25 INJECTION, SOLUTION INTRAMUSCULAR; INTRAVENOUS
Refills: 0 | Status: DISCONTINUED | OUTPATIENT
Start: 2024-06-17 | End: 2024-06-17

## 2024-06-17 RX ORDER — ONDANSETRON HYDROCHLORIDE 2 MG/ML
4 INJECTION INTRAMUSCULAR; INTRAVENOUS ONCE
Refills: 0 | Status: DISCONTINUED | OUTPATIENT
Start: 2024-06-17 | End: 2024-07-01

## 2024-06-17 RX ORDER — BUDESONIDE AND FORMOTEROL FUMARATE DIHYDRATE 160; 4.5 UG/1; UG/1
2 AEROSOL RESPIRATORY (INHALATION)
Refills: 0 | DISCHARGE

## 2024-06-17 RX ORDER — CETIRIZINE HYDROCHLORIDE 10 MG/1
1 TABLET ORAL
Refills: 0 | DISCHARGE

## 2024-06-17 RX ADMIN — DEXTROSE MONOHYDRATE AND SODIUM CHLORIDE 100 MILLILITER(S): 5; .3 INJECTION, SOLUTION INTRAVENOUS at 13:14

## 2024-06-18 ENCOUNTER — APPOINTMENT (OUTPATIENT)
Dept: OTOLARYNGOLOGY | Facility: CLINIC | Age: 29
End: 2024-06-18
Payer: COMMERCIAL

## 2024-06-18 DIAGNOSIS — Z98.890 OTHER SPECIFIED POSTPROCEDURAL STATES: ICD-10-CM

## 2024-06-18 PROCEDURE — 99024 POSTOP FOLLOW-UP VISIT: CPT

## 2024-06-18 RX ORDER — ONDANSETRON 4 MG/1
4 TABLET, ORALLY DISINTEGRATING ORAL EVERY 8 HOURS
Qty: 15 | Refills: 0 | Status: ACTIVE | COMMUNITY
Start: 2024-06-18 | End: 1900-01-01

## 2024-06-18 NOTE — END OF VISIT
[FreeTextEntry3] : I personally saw and examined POLINA FERNANDEZ  in detail. I spoke to RACHEL Rodriguez regarding the assessment and plan of care. I performed the procedures and relevant physical exam. I have made changes to the body of the note wherever necessary and appropriate.

## 2024-06-18 NOTE — REASON FOR VISIT
[Post-Operative Visit] : a post-operative visit [FreeTextEntry2] : s/p septo bitr and nvr with b/l spreaders 6/17/24

## 2024-06-18 NOTE — ASSESSMENT
[FreeTextEntry1] : Mr. FERNANDEZ is a 29 year male s/p septo bitr and nvr with b/l spreaders 6/17/24 POD1 with n/v since last night. He is had one episode of vomiting in office, no bleeding. scant bleeding from the nose, suctioned no active bleeding.  I suspect the bleeding that he is having is secondary to Valsalva during his vomiting.  I do not suspect the bleeding is causing the vomiting is in between vomiting episodes and seeing no active bleeding.   - rx Zofran 4 mg ODT sent - Afrin for next 3 days in case causing some vomiting and to help minimize any bleeding - can start small squirts of nasal saline spray today and continue TID followed by peroxide cleaning and bacitracin - pt shown how to clean with q-tips and peroxide followed by bacitracin, written instruction provided - nasal tip dressing re applied, pt can stop using once no longer necessary - f/up next week for dressing and splint removal

## 2024-06-18 NOTE — HISTORY OF PRESENT ILLNESS
[de-identified] : s/p septo bitr and nvr with b/l spreaders 6/17/24 [FreeTextEntry1] : pt started with nausea and vomiting last night, also with L sided epistaxis, he is taking Oxycodone, has eaten toast and soup

## 2024-06-25 ENCOUNTER — APPOINTMENT (OUTPATIENT)
Dept: OTOLARYNGOLOGY | Facility: CLINIC | Age: 29
End: 2024-06-25
Payer: COMMERCIAL

## 2024-06-25 VITALS — BODY MASS INDEX: 18.55 KG/M2 | HEIGHT: 73 IN | WEIGHT: 140 LBS

## 2024-06-25 PROBLEM — J34.2 DEVIATED NASAL SEPTUM: Chronic | Status: ACTIVE | Noted: 2024-06-03

## 2024-06-25 PROBLEM — J45.909 UNSPECIFIED ASTHMA, UNCOMPLICATED: Chronic | Status: ACTIVE | Noted: 2024-06-03

## 2024-06-25 PROBLEM — U07.1 COVID-19: Chronic | Status: ACTIVE | Noted: 2024-06-03

## 2024-06-25 PROCEDURE — 99024 POSTOP FOLLOW-UP VISIT: CPT

## 2024-07-17 ENCOUNTER — APPOINTMENT (OUTPATIENT)
Dept: OTOLARYNGOLOGY | Facility: CLINIC | Age: 29
End: 2024-07-17
Payer: COMMERCIAL

## 2024-07-17 VITALS — BODY MASS INDEX: 18.55 KG/M2 | HEIGHT: 73 IN | WEIGHT: 140 LBS

## 2024-07-17 PROCEDURE — 99024 POSTOP FOLLOW-UP VISIT: CPT

## 2024-07-17 RX ORDER — FLUTICASONE PROPIONATE 50 UG/1
50 SPRAY, METERED NASAL DAILY
Qty: 1 | Refills: 2 | Status: ACTIVE | COMMUNITY
Start: 2024-07-17 | End: 1900-01-01

## 2024-07-17 RX ORDER — AZELASTINE HYDROCHLORIDE 137 UG/1
0.1 SPRAY, METERED NASAL
Qty: 1 | Refills: 2 | Status: ACTIVE | COMMUNITY
Start: 2024-07-17 | End: 1900-01-01

## 2024-09-11 ENCOUNTER — NON-APPOINTMENT (OUTPATIENT)
Age: 29
End: 2024-09-11

## 2024-09-11 ENCOUNTER — APPOINTMENT (OUTPATIENT)
Dept: OTOLARYNGOLOGY | Facility: CLINIC | Age: 29
End: 2024-09-11

## 2024-10-03 RX ORDER — BUDESONIDE AND FORMOTEROL FUMARATE DIHYDRATE 80; 4.5 UG/1; UG/1
80-4.5 AEROSOL RESPIRATORY (INHALATION) TWICE DAILY
Qty: 1 | Refills: 3 | Status: ACTIVE | COMMUNITY
Start: 2024-10-03 | End: 1900-01-01

## 2024-10-15 RX ORDER — FLUTICASONE PROPIONATE AND SALMETEROL 250; 50 UG/1; UG/1
250-50 POWDER RESPIRATORY (INHALATION)
Qty: 1 | Refills: 2 | Status: ACTIVE | COMMUNITY
Start: 2024-10-15 | End: 1900-01-01

## 2024-11-04 ENCOUNTER — APPOINTMENT (OUTPATIENT)
Dept: PULMONOLOGY | Facility: CLINIC | Age: 29
End: 2024-11-04
Payer: COMMERCIAL

## 2024-11-04 VITALS
HEIGHT: 73 IN | DIASTOLIC BLOOD PRESSURE: 76 MMHG | HEART RATE: 74 BPM | RESPIRATION RATE: 18 BRPM | WEIGHT: 138 LBS | BODY MASS INDEX: 18.29 KG/M2 | TEMPERATURE: 97.4 F | OXYGEN SATURATION: 96 % | SYSTOLIC BLOOD PRESSURE: 133 MMHG

## 2024-11-04 PROCEDURE — 99214 OFFICE O/P EST MOD 30 MIN: CPT

## 2024-11-13 ENCOUNTER — APPOINTMENT (OUTPATIENT)
Dept: OTOLARYNGOLOGY | Facility: CLINIC | Age: 29
End: 2024-11-13
Payer: COMMERCIAL

## 2024-11-13 VITALS
BODY MASS INDEX: 18.29 KG/M2 | HEART RATE: 88 BPM | SYSTOLIC BLOOD PRESSURE: 141 MMHG | HEIGHT: 73 IN | DIASTOLIC BLOOD PRESSURE: 75 MMHG | WEIGHT: 138 LBS

## 2024-11-13 DIAGNOSIS — Z98.890 OTHER SPECIFIED POSTPROCEDURAL STATES: ICD-10-CM

## 2024-11-13 DIAGNOSIS — J30.89 OTHER ALLERGIC RHINITIS: ICD-10-CM

## 2024-11-13 PROCEDURE — 99213 OFFICE O/P EST LOW 20 MIN: CPT

## 2024-11-18 ENCOUNTER — APPOINTMENT (OUTPATIENT)
Dept: UROLOGY | Facility: CLINIC | Age: 29
End: 2024-11-18

## 2024-11-18 ENCOUNTER — NON-APPOINTMENT (OUTPATIENT)
Age: 29
End: 2024-11-18

## 2024-11-18 VITALS
HEART RATE: 67 BPM | RESPIRATION RATE: 17 BRPM | DIASTOLIC BLOOD PRESSURE: 77 MMHG | WEIGHT: 135 LBS | HEIGHT: 73 IN | BODY MASS INDEX: 17.89 KG/M2 | SYSTOLIC BLOOD PRESSURE: 118 MMHG

## 2024-11-18 DIAGNOSIS — Z87.891 PERSONAL HISTORY OF NICOTINE DEPENDENCE: ICD-10-CM

## 2024-11-18 DIAGNOSIS — Z30.09 ENCOUNTER FOR OTHER GENERAL COUNSELING AND ADVICE ON CONTRACEPTION: ICD-10-CM

## 2024-11-18 PROCEDURE — 99204 OFFICE O/P NEW MOD 45 MIN: CPT

## 2024-12-01 PROBLEM — Z30.09 VISIT FOR VASECTOMY EVALUATION: Status: ACTIVE | Noted: 2024-12-01

## 2024-12-17 ENCOUNTER — APPOINTMENT (OUTPATIENT)
Dept: PULMONOLOGY | Facility: CLINIC | Age: 29
End: 2024-12-17
Payer: COMMERCIAL

## 2024-12-17 VITALS
RESPIRATION RATE: 16 BRPM | HEIGHT: 73 IN | WEIGHT: 144 LBS | BODY MASS INDEX: 19.09 KG/M2 | DIASTOLIC BLOOD PRESSURE: 84 MMHG | OXYGEN SATURATION: 95 % | SYSTOLIC BLOOD PRESSURE: 148 MMHG | HEART RATE: 67 BPM

## 2024-12-17 DIAGNOSIS — J45.909 UNSPECIFIED ASTHMA, UNCOMPLICATED: ICD-10-CM

## 2024-12-17 PROCEDURE — 99214 OFFICE O/P EST MOD 30 MIN: CPT

## 2024-12-18 ENCOUNTER — OUTPATIENT (OUTPATIENT)
Dept: OUTPATIENT SERVICES | Facility: HOSPITAL | Age: 29
LOS: 1 days | End: 2024-12-18
Payer: COMMERCIAL

## 2024-12-18 ENCOUNTER — APPOINTMENT (OUTPATIENT)
Dept: UROLOGY | Facility: CLINIC | Age: 29
End: 2024-12-18

## 2024-12-18 VITALS — HEART RATE: 75 BPM | SYSTOLIC BLOOD PRESSURE: 112 MMHG | DIASTOLIC BLOOD PRESSURE: 76 MMHG

## 2024-12-18 DIAGNOSIS — Z30.2 ENCOUNTER FOR STERILIZATION: ICD-10-CM

## 2024-12-18 DIAGNOSIS — R35.0 FREQUENCY OF MICTURITION: ICD-10-CM

## 2024-12-18 DIAGNOSIS — Z98.890 OTHER SPECIFIED POSTPROCEDURAL STATES: Chronic | ICD-10-CM

## 2024-12-18 PROCEDURE — 55250 REMOVAL OF SPERM DUCT(S): CPT

## 2024-12-19 ENCOUNTER — NON-APPOINTMENT (OUTPATIENT)
Age: 29
End: 2024-12-19

## 2024-12-26 LAB — CORE LAB BIOPSY: NORMAL

## 2024-12-30 DIAGNOSIS — Z30.2 ENCOUNTER FOR STERILIZATION: ICD-10-CM

## 2025-06-16 ENCOUNTER — APPOINTMENT (OUTPATIENT)
Dept: PULMONOLOGY | Facility: CLINIC | Age: 30
End: 2025-06-16
Payer: COMMERCIAL

## 2025-06-16 VITALS
WEIGHT: 140 LBS | OXYGEN SATURATION: 97 % | DIASTOLIC BLOOD PRESSURE: 75 MMHG | SYSTOLIC BLOOD PRESSURE: 124 MMHG | RESPIRATION RATE: 16 BRPM | HEART RATE: 65 BPM | BODY MASS INDEX: 18.55 KG/M2 | TEMPERATURE: 97.2 F | HEIGHT: 73 IN

## 2025-06-16 PROCEDURE — 99214 OFFICE O/P EST MOD 30 MIN: CPT

## 2025-06-23 RX ORDER — ALBUTEROL SULFATE AND BUDESONIDE 90; 80 UG/1; UG/1
90-80 AEROSOL, METERED RESPIRATORY (INHALATION)
Qty: 1 | Refills: 11 | Status: ACTIVE | COMMUNITY
Start: 2025-06-16

## 2025-07-08 ENCOUNTER — APPOINTMENT (OUTPATIENT)
Dept: OTOLARYNGOLOGY | Facility: CLINIC | Age: 30
End: 2025-07-08
Payer: COMMERCIAL

## 2025-07-08 ENCOUNTER — NON-APPOINTMENT (OUTPATIENT)
Age: 30
End: 2025-07-08

## 2025-07-08 VITALS
SYSTOLIC BLOOD PRESSURE: 129 MMHG | WEIGHT: 145 LBS | HEART RATE: 73 BPM | HEIGHT: 73 IN | BODY MASS INDEX: 19.22 KG/M2 | DIASTOLIC BLOOD PRESSURE: 69 MMHG

## 2025-07-08 PROCEDURE — 31231 NASAL ENDOSCOPY DX: CPT

## 2025-07-08 PROCEDURE — 99213 OFFICE O/P EST LOW 20 MIN: CPT | Mod: 25

## 2025-07-08 RX ORDER — FLUTICASONE PROPIONATE 50 UG/1
50 SPRAY NASAL
Qty: 48 | Refills: 1 | Status: ACTIVE | COMMUNITY
Start: 2025-07-08 | End: 1900-01-01

## 2025-07-21 ENCOUNTER — NON-APPOINTMENT (OUTPATIENT)
Age: 30
End: 2025-07-21

## 2025-07-25 ENCOUNTER — APPOINTMENT (OUTPATIENT)
Dept: INTERNAL MEDICINE | Facility: CLINIC | Age: 30
End: 2025-07-25
Payer: COMMERCIAL

## 2025-07-25 VITALS
DIASTOLIC BLOOD PRESSURE: 82 MMHG | HEIGHT: 72 IN | BODY MASS INDEX: 19.64 KG/M2 | TEMPERATURE: 98.2 F | HEART RATE: 61 BPM | WEIGHT: 145 LBS | SYSTOLIC BLOOD PRESSURE: 128 MMHG | OXYGEN SATURATION: 98 %

## 2025-07-25 DIAGNOSIS — Z00.00 ENCOUNTER FOR GENERAL ADULT MEDICAL EXAMINATION W/OUT ABNORMAL FINDINGS: ICD-10-CM

## 2025-07-25 DIAGNOSIS — J45.909 UNSPECIFIED ASTHMA, UNCOMPLICATED: ICD-10-CM

## 2025-07-25 PROCEDURE — 99395 PREV VISIT EST AGE 18-39: CPT

## 2025-07-25 PROCEDURE — 36415 COLL VENOUS BLD VENIPUNCTURE: CPT

## 2025-07-28 LAB
25(OH)D3 SERPL-MCNC: 24.3 NG/ML
ALBUMIN SERPL ELPH-MCNC: 4.9 G/DL
ALP BLD-CCNC: 44 U/L
ALT SERPL-CCNC: 29 U/L
ANION GAP SERPL CALC-SCNC: 12 MMOL/L
AST SERPL-CCNC: 21 U/L
BILIRUB SERPL-MCNC: 0.4 MG/DL
BUN SERPL-MCNC: 16 MG/DL
CALCIUM SERPL-MCNC: 9.8 MG/DL
CHLORIDE SERPL-SCNC: 106 MMOL/L
CHOLEST SERPL-MCNC: 189 MG/DL
CO2 SERPL-SCNC: 22 MMOL/L
CREAT SERPL-MCNC: 0.97 MG/DL
EGFRCR SERPLBLD CKD-EPI 2021: 108 ML/MIN/1.73M2
ESTIMATED AVERAGE GLUCOSE: 108 MG/DL
GLUCOSE SERPL-MCNC: 88 MG/DL
HBA1C MFR BLD HPLC: 5.4 %
HCT VFR BLD CALC: 48.5 %
HDLC SERPL-MCNC: 68 MG/DL
HGB BLD-MCNC: 16.6 G/DL
LDLC SERPL-MCNC: 108 MG/DL
MCHC RBC-ENTMCNC: 31.4 PG
MCHC RBC-ENTMCNC: 34.2 G/DL
MCV RBC AUTO: 91.9 FL
NONHDLC SERPL-MCNC: 121 MG/DL
PLATELET # BLD AUTO: 174 K/UL
POTASSIUM SERPL-SCNC: 4.6 MMOL/L
PROT SERPL-MCNC: 7 G/DL
RBC # BLD: 5.28 M/UL
RBC # FLD: 12 %
SODIUM SERPL-SCNC: 141 MMOL/L
TRIGL SERPL-MCNC: 72 MG/DL
TSH SERPL-ACNC: 2.64 UIU/ML
WBC # FLD AUTO: 4.72 K/UL

## 2025-08-06 ENCOUNTER — APPOINTMENT (OUTPATIENT)
Dept: INTERNAL MEDICINE | Facility: CLINIC | Age: 30
End: 2025-08-06

## 2025-08-13 ENCOUNTER — APPOINTMENT (OUTPATIENT)
Dept: INTERNAL MEDICINE | Facility: CLINIC | Age: 30
End: 2025-08-13

## 2025-08-13 ENCOUNTER — MED ADMIN CHARGE (OUTPATIENT)
Age: 30
End: 2025-08-13

## 2025-08-13 DIAGNOSIS — Z23 ENCOUNTER FOR IMMUNIZATION: ICD-10-CM

## 2025-08-13 PROCEDURE — 90715 TDAP VACCINE 7 YRS/> IM: CPT

## 2025-08-13 PROCEDURE — 90471 IMMUNIZATION ADMIN: CPT

## (undated) DEVICE — SOL IRR BAG NS 0.9% 1000ML

## (undated) DEVICE — DRAPE MAGNETIC INSTRUMENT MEDIUM

## (undated) DEVICE — FEEDING TUBE NG SUMP 16FR 48"

## (undated) DEVICE — DRSG STERISTRIPS 0.5 X 4"

## (undated) DEVICE — SUT CHROMIC GUT 4-0 18" P-13

## (undated) DEVICE — WARMING BLANKET LOWER ADULT

## (undated) DEVICE — BLADE SURGICAL #11 CARBON

## (undated) DEVICE — GLV 7 PROTEXIS (WHITE)

## (undated) DEVICE — Device

## (undated) DEVICE — NDL HYPO REGULAR BEVEL 25G X 1.5" (BLUE)

## (undated) DEVICE — DRSG NASAL DRESSING HOLDER

## (undated) DEVICE — NDL HYPO NONSAFE 30G X 0.5" (BEIGE)

## (undated) DEVICE — APPLICATOR Q TIP 6" WOOD STEM

## (undated) DEVICE — SUT PLAIN GUT FAST ABSORBING 5-0 PC-1

## (undated) DEVICE — SOL IRR POUR NS 0.9% 500ML

## (undated) DEVICE — GOWN TRIMAX XXL

## (undated) DEVICE — SUT PLAIN GUT 4-0 18" CT-1

## (undated) DEVICE — TUBING SUCTION 20FT

## (undated) DEVICE — DRAPE INSTRUMENT POUCH 6.75" X 11"

## (undated) DEVICE — SUT PDS II 4-0 18" P-3

## (undated) DEVICE — SUT CHROMIC 3-0 27" KS

## (undated) DEVICE — ELCTR BOVIE TIP NEEDLE INSULATED 2.8" EDGE

## (undated) DEVICE — SYR LUER LOK 10CC

## (undated) DEVICE — SUT MONOCRYL 5-0 18" P-3 UNDYED

## (undated) DEVICE — BLADE SURGICAL #10 CARBON

## (undated) DEVICE — SUT SURGIPRO II 5-0 18" P-13

## (undated) DEVICE — VENODYNE/SCD SLEEVE CALF MEDIUM

## (undated) DEVICE — GLV 6.5 PROTEXIS (WHITE)

## (undated) DEVICE — PACK NASAL

## (undated) DEVICE — DRAPE TOWEL BLUE 17" X 24"

## (undated) DEVICE — DRSG 4 X 3" 4PLY STERILE

## (undated) DEVICE — SUT PLAIN GUT 6-0 18" PC-1

## (undated) DEVICE — BASIN AMBULATORY

## (undated) DEVICE — SOL IRR POUR H2O 250ML

## (undated) DEVICE — SUT CHROMIC 3-0 30" C-13

## (undated) DEVICE — BLADE MEDTRONIC ENT INFERIOR TURBINATE ROTATABLE STRAIGHT 2MM X11CM

## (undated) DEVICE — SUT PDS II 5-0 18" P-3 UNDYED

## (undated) DEVICE — MARKING PEN W RULER

## (undated) DEVICE — MEDICATION LABELS W MARKER

## (undated) DEVICE — SPECIMEN CONTAINER 100ML

## (undated) DEVICE — POSITIONER FOAM EGG CRATE ULNAR 2PCS (PINK)

## (undated) DEVICE — ELCTR BOVIE PENCIL HANDPIECE

## (undated) DEVICE — DRSG MASTISOL

## (undated) DEVICE — BLADE SCALPEL SAFETYLOCK #15

## (undated) DEVICE — DRAPE LIGHT HANDLE COVER (GREEN)

## (undated) DEVICE — DRAPE 1/2 SHEET 40X57"

## (undated) DEVICE — SOL ANTI FOG

## (undated) DEVICE — DRAPE MAYO STAND 30"